# Patient Record
Sex: MALE | Race: OTHER | URBAN - METROPOLITAN AREA
[De-identification: names, ages, dates, MRNs, and addresses within clinical notes are randomized per-mention and may not be internally consistent; named-entity substitution may affect disease eponyms.]

---

## 2017-04-04 DIAGNOSIS — I10 ESSENTIAL HYPERTENSION, BENIGN: ICD-10-CM

## 2017-04-04 RX ORDER — LISINOPRIL AND HYDROCHLOROTHIAZIDE 10; 12.5 MG/1; MG/1
TABLET ORAL
Qty: 90 TAB | Refills: 0 | Status: SHIPPED | OUTPATIENT
Start: 2017-04-04 | End: 2017-07-03 | Stop reason: SDUPTHER

## 2017-07-03 DIAGNOSIS — I10 ESSENTIAL HYPERTENSION, BENIGN: ICD-10-CM

## 2017-07-04 RX ORDER — LISINOPRIL AND HYDROCHLOROTHIAZIDE 10; 12.5 MG/1; MG/1
TABLET ORAL
Qty: 90 TAB | Refills: 1 | Status: SHIPPED | OUTPATIENT
Start: 2017-07-04 | End: 2017-12-31 | Stop reason: SDUPTHER

## 2017-12-31 DIAGNOSIS — I10 ESSENTIAL HYPERTENSION, BENIGN: ICD-10-CM

## 2018-01-01 RX ORDER — LISINOPRIL AND HYDROCHLOROTHIAZIDE 10; 12.5 MG/1; MG/1
TABLET ORAL
Qty: 90 TAB | Refills: 1 | Status: SHIPPED | OUTPATIENT
Start: 2018-01-01 | End: 2018-06-26 | Stop reason: SDUPTHER

## 2018-05-14 ENCOUNTER — OFFICE VISIT (OUTPATIENT)
Dept: FAMILY MEDICINE CLINIC | Age: 43
End: 2018-05-14

## 2018-05-14 VITALS — WEIGHT: 231.38 LBS | HEIGHT: 74 IN | BODY MASS INDEX: 29.69 KG/M2

## 2018-05-14 DIAGNOSIS — L23.7 POISON IVY: Primary | ICD-10-CM

## 2018-05-14 RX ORDER — CEPHALEXIN 500 MG/1
500 CAPSULE ORAL 2 TIMES DAILY
Qty: 14 CAP | Refills: 0 | Status: SHIPPED | OUTPATIENT
Start: 2018-05-14 | End: 2018-05-21

## 2018-05-14 RX ORDER — PREDNISONE 10 MG/1
TABLET ORAL
Qty: 21 TAB | Refills: 0 | Status: SHIPPED | OUTPATIENT
Start: 2018-05-14 | End: 2021-04-05 | Stop reason: ALTCHOICE

## 2018-05-14 NOTE — MR AVS SNAPSHOT
[address] [address] 
[phone] Patient: Demetri Stanford MRN:  [id number] Visit Information Date & Time Provider Department Dept. Phone Encounter #  
 5/14/2018  7:00 AM Julito Esqueda NP [address] [facility] [phone] [id number] Upcoming Health Maintenance Date Due Influenza Age 5 to Adult 8/1/2018 DTaP/Tdap/Td series (2 - Td) 4/7/2024 Allergies as of 5/14/2018  Review Complete On: 5/14/2018 By: Ines Aguirre LPN Severity Noted Reaction Type Reactions Codeine  04/30/2010    Other (comments) Mental changes Demerol [Meperidine]  04/30/2010    Other (comments) Mental changes Current Immunizations  Never Reviewed Name Date Tdap 4/7/2014 Not reviewed this visit You Were Diagnosed With   
  
 Codes Comments Poison ivy    -  Primary ICD-10-CM: L23.7 ICD-9-CM: 692.6 Vitals Height(growth percentile) Weight(growth percentile) BMI Smoking Status 6' 2\" (1.88 m) 231 lb 6 oz (105 kg) 29.71 kg/m2 Never Smoker Vitals History BMI and BSA Data Body Mass Index Body Surface Area  
 29.71 kg/m 2 2.34 m 2 Preferred Pharmacy Pharmacy Name Phone [facility] [place], [address] [place] AT [facility] [phone] Your Updated Medication List  
  
   
This list is accurate as of 5/14/18  7:29 AM.  Always use your most recent med list.  
  
  
  
  
 cephALEXin 500 mg capsule Commonly known as:  Christi Hacker Take 1 Cap by mouth two (2) times a day for 7 days. lisinopril-hydroCHLOROthiazide 10-12.5 mg per tablet Commonly known as:  PRINZIDE, ZESTORETIC  
TAKE 1 TABLET DAILY  
  
 methylPREDNISolone (PF) 125 mg/2 mL Solr Commonly known as:  SOLU-MEDROL 2 mL by IntraVENous route once for 1 dose. predniSONE 10 mg dose pack Commonly known as:  STERAPRED DS  
 See administration instruction per 10mg dose pack - 6 days Prescriptions Sent to Pharmacy Refills  
 predniSONE (STERAPRED DS) 10 mg dose pack 0 Sig: See administration instruction per 10mg dose pack - 6 days Class: Normal  
 Pharmacy: [address] Ph #: [phone]  
 cephALEXin (KEFLEX) 500 mg capsule 0 Sig: Take 1 Cap by mouth two (2) times a day for 7 days. Class: Normal  
 Pharmacy: [address] [facility] Ph #: [phone] Route: Oral  
  
We Performed the Following METHYLPREDNISOLONE INJECTION [ [facility]] CA THER/PROPH/DIAG INJECTION, SUBCUT/IM [id number] CPT(R)] Introducing [facility] SERVICES! Li George introduces [employer] patient portal. Now you can access parts of your medical record, email your doctor's office, and request medication refills online. 1. In your internet browser, go to [email or web address] 2. Click on the First Time User? Click Here link in the Sign In box. You will see the New Member Sign Up page. 3. Enter your [employer] Access Code exactly as it appears below. You will not need to use this code after youve completed the sign-up process. If you do not sign up before the expiration date, you must request a new code. · [employer] Access Code: [email or web address] Expires: 8/12/2018  7:21 AM 
 
4. Enter the last four digits of your Social Security Number (xxxx) and Date of Birth (mm/dd/yyyy) as indicated and click Submit. You will be taken to the next sign-up page. 5. Create a [employer] ID. This will be your [employer] login ID and cannot be changed, so think of one that is secure and easy to remember. 6. Create a [employer] password. You can change your password at any time. 7. Enter your Password Reset Question and Answer.  This can be used at a later time if you forget your password. 8. Enter your e-mail address. You will receive e-mail notification when new information is available in [address] 9. Click Sign Up. You can now view and download portions of your medical record. 10. Click the Download Summary menu link to download a portable copy of your medical information. If you have questions, please visit the Frequently Asked Questions section of the [employer] website. Remember, [employer] is NOT to be used for urgent needs. For medical emergencies, dial 911. Now available from your iPhone and Android! Please provide this summary of care documentation to your next provider. Your primary care clinician is listed as SMITHA ECHEVERRIA. If you have any questions after today's visit, please call [phone].

## 2018-05-14 NOTE — PROGRESS NOTES
1. Have you been to the ER, urgent care clinic since your last visit? Hospitalized since your last visit? No    2. Have you seen or consulted any other health care providers outside of the [facility] since your last visit? Include any pap smears or colon screening. No    Chief Complaint   Patient presents with    Poison Ivy/Poison Oak/Poison Sumac Exposure     bilateral arms, above right eye and on left ankle x 4 days     Pt states he has poison oak on bilateral arms, above right eye and on left ankle x 4 days.

## 2018-05-14 NOTE — PROGRESS NOTES
HISTORY OF PRESENT ILLNESS  Luis Paige is a 37 y.o. male. HPI  Chief Complaint   Patient presents with    Poison Ivy/Poison Oak/Poison Sumac Exposure     bilateral arms, above right eye and on left ankle x 4 days   Got Friday from cutting down tree at friends house  Now all over arms humble with opening clear weeping  Itch is intense and painful/red now  Using drying agent otc to help with weeping    ROS  A comprehensive review of system was obtained and negative except findings in the HPI    Visit Vitals    Ht 6' 2\" (1.88 m)    Wt 231 lb 6 oz (105 kg)    BMI 29.71 kg/m2     Physical Exam   Constitutional: He appears well-developed and well-nourished. No distress. Cardiovascular: Normal rate and regular rhythm. No murmur heard. Pulmonary/Chest: Breath sounds normal. No respiratory distress. Skin: Rash noted. There is erythema. humble arms with large amount of surface area from biceps down with poison ivy rash, opening and weeping, right arm with red area that is also swollen in appearance. Nursing note and vitals reviewed. ASSESSMENT and PLAN  Encounter Diagnoses   Name Primary?  Poison ivy Yes     Orders Placed This Encounter    methylPREDNISolone, PF, (SOLU-MEDROL) 125 mg/2 mL solr    predniSONE (STERAPRED DS) 10 mg dose pack    cephALEXin (KEFLEX) 500 mg capsule     Solumedrol 125mg IM now  pred pack given to start this evening  Keflex to cover for cellulitis  Reviewed wound care    I have discussed the diagnosis with the patient and the intended plan as seen in the above orders. The patient has received an after-visit summary and questions were answered concerning future plans. Patient conveyed understanding of the plan at the time of the visit.     Lexi Butt, MSN, ANP  5/14/2018

## 2018-06-26 DIAGNOSIS — I10 ESSENTIAL HYPERTENSION, BENIGN: ICD-10-CM

## 2018-06-26 RX ORDER — LISINOPRIL AND HYDROCHLOROTHIAZIDE 10; 12.5 MG/1; MG/1
TABLET ORAL
Qty: 90 TAB | Refills: 1 | Status: SHIPPED | OUTPATIENT
Start: 2018-06-26 | End: 2018-12-23 | Stop reason: SDUPTHER

## 2018-12-23 DIAGNOSIS — I10 ESSENTIAL HYPERTENSION, BENIGN: ICD-10-CM

## 2018-12-24 RX ORDER — LISINOPRIL AND HYDROCHLOROTHIAZIDE 10; 12.5 MG/1; MG/1
TABLET ORAL
Qty: 90 TAB | Refills: 1 | Status: SHIPPED | OUTPATIENT
Start: 2018-12-24 | End: 2019-06-22 | Stop reason: SDUPTHER

## 2019-06-22 DIAGNOSIS — I10 ESSENTIAL HYPERTENSION, BENIGN: ICD-10-CM

## 2019-06-22 NOTE — LETTER
6/24/2019 2:22 PM 
 
Mr. Winsome Mon [address] [facility] [address] [id number] Dear Mr. Altagracia Thompson: 
 
Carolyn Mendez missed you! Please call our office at [phone] and schedule a follow up appointment for your continued care. Sincerely, Andrew Ervin MD

## 2019-06-24 RX ORDER — LISINOPRIL AND HYDROCHLOROTHIAZIDE 10; 12.5 MG/1; MG/1
TABLET ORAL
Qty: 90 TAB | Refills: 1 | Status: SHIPPED | OUTPATIENT
Start: 2019-06-24 | End: 2019-12-22

## 2019-12-21 DIAGNOSIS — I10 ESSENTIAL HYPERTENSION, BENIGN: ICD-10-CM

## 2019-12-22 RX ORDER — LISINOPRIL AND HYDROCHLOROTHIAZIDE 10; 12.5 MG/1; MG/1
TABLET ORAL
Qty: 90 TAB | Refills: 4 | Status: SHIPPED | OUTPATIENT
Start: 2019-12-22 | End: 2021-03-16

## 2021-03-16 DIAGNOSIS — I10 ESSENTIAL HYPERTENSION, BENIGN: ICD-10-CM

## 2021-03-16 RX ORDER — LISINOPRIL AND HYDROCHLOROTHIAZIDE 10; 12.5 MG/1; MG/1
TABLET ORAL
Qty: 90 TAB | Refills: 0 | Status: SHIPPED | OUTPATIENT
Start: 2021-03-16 | End: 2021-04-05 | Stop reason: SDUPTHER

## 2021-03-16 NOTE — TELEPHONE ENCOUNTER
Called and spoke with patient. Advised of RX sent to pharmacy and that he needs to be seen to get any further refills as it has been almost 3 years. Patient verbalized understanding.  Apt scheduled for 4/5/21

## 2021-04-05 ENCOUNTER — OFFICE VISIT (OUTPATIENT)
Dept: FAMILY MEDICINE CLINIC | Age: 46
End: 2021-04-05
Payer: COMMERCIAL

## 2021-04-05 VITALS
SYSTOLIC BLOOD PRESSURE: 120 MMHG | HEART RATE: 85 BPM | RESPIRATION RATE: 20 BRPM | OXYGEN SATURATION: 98 % | TEMPERATURE: 98 F | BODY MASS INDEX: 30.93 KG/M2 | HEIGHT: 74 IN | WEIGHT: 241 LBS | DIASTOLIC BLOOD PRESSURE: 84 MMHG

## 2021-04-05 DIAGNOSIS — I10 ESSENTIAL HYPERTENSION, BENIGN: ICD-10-CM

## 2021-04-05 DIAGNOSIS — Z12.5 PROSTATE CANCER SCREENING: ICD-10-CM

## 2021-04-05 DIAGNOSIS — R73.9 ELEVATED BLOOD SUGAR: Primary | ICD-10-CM

## 2021-04-05 LAB
EST. AVERAGE GLUCOSE BLD GHB EST-MCNC: 100 MG/DL
HBA1C MFR BLD: 5.1 % (ref 4–5.6)
PSA SERPL-MCNC: 0.6 NG/ML (ref 0.01–4)

## 2021-04-05 PROCEDURE — 99214 OFFICE O/P EST MOD 30 MIN: CPT | Performed by: FAMILY MEDICINE

## 2021-04-05 RX ORDER — LISINOPRIL AND HYDROCHLOROTHIAZIDE 10; 12.5 MG/1; MG/1
1 TABLET ORAL DAILY
Qty: 90 TAB | Refills: 3 | Status: SHIPPED | OUTPATIENT
Start: 2021-04-05 | End: 2022-05-17

## 2021-04-05 NOTE — PROGRESS NOTES
Patient here for bp med refill. 1. Have you been to the ER, urgent care clinic since your last visit? Hospitalized since your last visit? No    2. Have you seen or consulted any other health care providers outside of the [address] [place] [address] since your last visit? Include any pap smears or colon screening. No       No results found for: Bao Reid, MCA2, MCA3, MCAU   Chief Complaint   Patient presents with    Medication Refill     bp refill , needed an appt. he is a 55y.o. year old male who presents for evaluation. See Diabetic Report Card listed above. Patient Active Problem List    Diagnosis    HTN (hypertension)    Corns and callosities       Reviewed PmHx, RxHx, FmHx, SocHx, AllgHx--dated and updated in the chart. Review of Systems - negative except as listed above in the HPI    Objective:     Vitals:    04/05/21 1054   BP: 120/84   Pulse: 85   Resp: 20   Temp: 98 °F (36.7 °C)   SpO2: 98%   Weight: 241 lb (109.3 kg)   Height: 6' 2\" (1.88 m)     Physical Examination: General appearance - alert, well appearing, and in no distress  Chest - clear to auscultation, no wheezes, rales or rhonchi, symmetric air entry  Heart - normal rate, regular rhythm, normal S1, S2, no murmurs, rubs, clicks or gallops       Assessment/ Plan:   Diagnoses and all orders for this visit:    1. Elevated blood sugar  -     HEMOGLOBIN A1C WITH EAG; Future  -inc sugar at work cpx, see scanned labs    2. Essential hypertension, benign  -     lisinopril-hydroCHLOROthiazide (PRINZIDE, ZESTORETIC) 10-12.5 mg per tablet; Take 1 Tab by mouth daily. -at goal    3. Prostate cancer screening  -     PSA, DIAGNOSTIC (PROSTATE SPECIFIC AG);  Future         Lab Results   Component Value Date/Time    Cholesterol, total 137 04/07/2014 08:02 AM    HDL Cholesterol 36 (L) 04/07/2014 08:02 AM    LDL, calculated 77 04/07/2014 08:02 AM    Triglyceride 119 04/07/2014 08:02 AM     Lab Results   Component Value Date/Time    LDL, calculated 77 04/07/2014 08:02 AM    Creatinine 1.03 04/07/2014 08:02 AM          Discussed with patient goal of Diabetes to include:  HgA1C <7, LDL cholesterol <100, Blood pressure <140/80. Discussed with patient diet and weight management and to get regular exercise. Recommend yearly eye exams and daily foot care. The patient understands and agrees with the plan. I have discussed the diagnosis with the patient and the intended plan as seen in the above orders. The patient has received an after-visit summary and questions were answered concerning future plans. Medication Side Effects and Warnings were discussed with patient  Patient Labs were reviewed and or requested  Patient Past Records were reviewed and or requested    Demetri Lopez M.D. [address] [facility]    There are no Patient Instructions on file for this visit.

## 2021-04-06 NOTE — PROGRESS NOTES
Thank you for your visit,  and I hope that we met your expectations! Let us hope 2021 is a great year for you! For 2021 and beyond we are offering Virtual appointments for you, giving you more convenient access to your provider. ..just ask the  when you call our office for your next appointment. We also are offering E-Visits. You can find the link for an E-Visit in your [facility] keyona and this is a visit thru messaging and attached to your medical record. If you have a simple concern you can click on this link and answer few questions, by the end of the day your concerns will have been addressed. After reviewing your labs, they are within normal limits for your age. Keep working hard on diet and taking your medications that are prescribed. If you have any acute care needs and are having trouble getting an appointment. .. please send me a   [facility] message or have the  send me a message by calling [phone]. Have a blessed day and don't forget to be kind  to others! Ravi Christopher M.D.   Good Help to Those in Need  \"You may be whatever you resolve to be\"

## 2022-08-15 DIAGNOSIS — I10 ESSENTIAL HYPERTENSION, BENIGN: ICD-10-CM

## 2022-08-15 RX ORDER — LISINOPRIL AND HYDROCHLOROTHIAZIDE 10; 12.5 MG/1; MG/1
TABLET ORAL
Qty: 90 TABLET | Refills: 3 | Status: SHIPPED | OUTPATIENT
Start: 2022-08-15

## 2023-08-10 RX ORDER — LISINOPRIL AND HYDROCHLOROTHIAZIDE 12.5; 1 MG/1; MG/1
TABLET ORAL
Qty: 90 TABLET | Refills: 3 | Status: SHIPPED | OUTPATIENT
Start: 2023-08-10

## 2024-08-06 RX ORDER — LISINOPRIL/HYDROCHLOROTHIAZIDE 10-12.5 MG
TABLET ORAL
Qty: 90 TABLET | Refills: 3 | OUTPATIENT
Start: 2024-08-06

## 2025-03-18 LAB — HBA1C MFR BLD HPLC: 5.5 %

## 2025-04-25 ENCOUNTER — CLINICAL DOCUMENTATION (OUTPATIENT)
Facility: CLINIC | Age: 50
End: 2025-04-25

## 2025-06-27 ENCOUNTER — OFFICE VISIT (OUTPATIENT)
Facility: CLINIC | Age: 50
End: 2025-06-27
Payer: COMMERCIAL

## 2025-06-27 VITALS
RESPIRATION RATE: 18 BRPM | WEIGHT: 232.2 LBS | HEART RATE: 71 BPM | TEMPERATURE: 97.7 F | HEIGHT: 75 IN | SYSTOLIC BLOOD PRESSURE: 110 MMHG | OXYGEN SATURATION: 96 % | BODY MASS INDEX: 28.87 KG/M2 | DIASTOLIC BLOOD PRESSURE: 65 MMHG

## 2025-06-27 DIAGNOSIS — Z12.11 SCREEN FOR COLON CANCER: ICD-10-CM

## 2025-06-27 DIAGNOSIS — Z76.89 ENCOUNTER TO ESTABLISH CARE: Primary | ICD-10-CM

## 2025-06-27 DIAGNOSIS — I49.3 FREQUENT PVCS: ICD-10-CM

## 2025-06-27 DIAGNOSIS — I10 PRIMARY HYPERTENSION: ICD-10-CM

## 2025-06-27 DIAGNOSIS — E78.1 PURE HYPERTRIGLYCERIDEMIA: ICD-10-CM

## 2025-06-27 PROCEDURE — 3078F DIAST BP <80 MM HG: CPT | Performed by: STUDENT IN AN ORGANIZED HEALTH CARE EDUCATION/TRAINING PROGRAM

## 2025-06-27 PROCEDURE — 99204 OFFICE O/P NEW MOD 45 MIN: CPT | Performed by: STUDENT IN AN ORGANIZED HEALTH CARE EDUCATION/TRAINING PROGRAM

## 2025-06-27 PROCEDURE — 3074F SYST BP LT 130 MM HG: CPT | Performed by: STUDENT IN AN ORGANIZED HEALTH CARE EDUCATION/TRAINING PROGRAM

## 2025-06-27 RX ORDER — LISINOPRIL AND HYDROCHLOROTHIAZIDE 10; 12.5 MG/1; MG/1
1 TABLET ORAL DAILY
Qty: 90 TABLET | Refills: 1 | Status: SHIPPED | OUTPATIENT
Start: 2025-06-27

## 2025-06-27 ASSESSMENT — PATIENT HEALTH QUESTIONNAIRE - PHQ9
SUM OF ALL RESPONSES TO PHQ QUESTIONS 1-9: 0
2. FEELING DOWN, DEPRESSED OR HOPELESS: NOT AT ALL
1. LITTLE INTEREST OR PLEASURE IN DOING THINGS: NOT AT ALL
SUM OF ALL RESPONSES TO PHQ QUESTIONS 1-9: 0

## 2025-06-27 NOTE — PROGRESS NOTES
Family Medicine Initial Office Visit  Patient: Scott Schuster  1975, 50 y.o., male  Encounter Date: 6/27/2025      CHIEF COMPLAINT  Chief Complaint   Patient presents with    New Patient       SUBJECTIVE  History of Present Illness  Patient is a very pleasant 50-year-old male who presents today for abnormal cardiac stress test.      The patient underwent a treadmill stress test on 04/29/2025, which revealed the presence of PVCs as the sole abnormality during his physical examination. Previous stress tests have consistently yielded normal results. He reports no symptoms indicative of arrhythmia, such as palpitations or tachycardia, nor does he experience dizziness, dyspnea, or any other exertional symptoms. He remains asymptomatic. He notes the occurrence of one additional PVC within the time frame allowed by the National Fire Protection Association (NFPA) standard for  physicals, with the PVCs observed during the cool-down phase rather than the stress phase. An echocardiogram was performed, yielding normal results. He denies any family history of cardiac conditions or heart failure and does not use tobacco products.    The patient has a history of hypertension, initially diagnosed during his college years. He has adhered to a consistent antihypertensive medication regimen since diagnosis, with no alterations in dosage. Recently, he changed employment and has been working out of town for the past 8 months. Despite this, he maintains a healthy diet, avoiding restaurant food and preparing his own meals. He has significantly reduced his intake of sodium and sugar, with his only sugar consumption being two small servings of creamer in his daily coffee.     He reports normal bowel movements and urination, with no hematochezia or unintentional weight loss.     His current medications include lisinopril and hydrochlorothiazide, administered as one tablet orally daily.    He has not seen a primary care

## 2025-06-27 NOTE — PROGRESS NOTES
Chief Complaint   Patient presents with    New Patient     1. Have you been to the ER, urgent care clinic since your last visit?  Hospitalized since your last visit?No    2. Have you seen or consulted any other health care providers outside of the [facility] since your last visit?  Include any pap smears or colon screening. No

## 2025-07-16 LAB — NONINV COLON CA DNA+OCC BLD SCRN STL QL: NEGATIVE

## 2025-08-29 ENCOUNTER — TELEPHONE (OUTPATIENT)
Age: 50
End: 2025-08-29

## 2025-08-29 ENCOUNTER — OFFICE VISIT (OUTPATIENT)
Age: 50
End: 2025-08-29
Payer: COMMERCIAL

## 2025-08-29 ENCOUNTER — TRANSCRIBE ORDERS (OUTPATIENT)
Facility: HOSPITAL | Age: 50
End: 2025-08-29

## 2025-08-29 VITALS
SYSTOLIC BLOOD PRESSURE: 112 MMHG | WEIGHT: 229 LBS | HEIGHT: 75 IN | HEART RATE: 86 BPM | BODY MASS INDEX: 28.47 KG/M2 | DIASTOLIC BLOOD PRESSURE: 70 MMHG | OXYGEN SATURATION: 99 %

## 2025-08-29 DIAGNOSIS — Z00.00 ROUTINE GENERAL MEDICAL EXAMINATION AT HEALTH CARE FACILITY: Primary | ICD-10-CM

## 2025-08-29 DIAGNOSIS — I10 PRIMARY HYPERTENSION: Primary | ICD-10-CM

## 2025-08-29 DIAGNOSIS — Z76.89 ENCOUNTER TO ESTABLISH CARE: ICD-10-CM

## 2025-08-29 DIAGNOSIS — R00.2 PALPITATIONS: ICD-10-CM

## 2025-08-29 PROCEDURE — 3074F SYST BP LT 130 MM HG: CPT | Performed by: INTERNAL MEDICINE

## 2025-08-29 PROCEDURE — 99204 OFFICE O/P NEW MOD 45 MIN: CPT | Performed by: INTERNAL MEDICINE

## 2025-08-29 PROCEDURE — 93000 ELECTROCARDIOGRAM COMPLETE: CPT | Performed by: INTERNAL MEDICINE

## 2025-08-29 PROCEDURE — 3078F DIAST BP <80 MM HG: CPT | Performed by: INTERNAL MEDICINE

## 2025-08-29 ASSESSMENT — PATIENT HEALTH QUESTIONNAIRE - PHQ9
SUM OF ALL RESPONSES TO PHQ QUESTIONS 1-9: 0
SUM OF ALL RESPONSES TO PHQ QUESTIONS 1-9: 0
1. LITTLE INTEREST OR PLEASURE IN DOING THINGS: NOT AT ALL
2. FEELING DOWN, DEPRESSED OR HOPELESS: NOT AT ALL
SUM OF ALL RESPONSES TO PHQ QUESTIONS 1-9: 0
SUM OF ALL RESPONSES TO PHQ QUESTIONS 1-9: 0

## 2025-08-29 ASSESSMENT — LIFESTYLE VARIABLES
HOW OFTEN DO YOU HAVE A DRINK CONTAINING ALCOHOL: 4 OR MORE TIMES A WEEK
HOW MANY STANDARD DRINKS CONTAINING ALCOHOL DO YOU HAVE ON A TYPICAL DAY: 1 OR 2